# Patient Record
Sex: MALE | Race: WHITE
[De-identification: names, ages, dates, MRNs, and addresses within clinical notes are randomized per-mention and may not be internally consistent; named-entity substitution may affect disease eponyms.]

---

## 2017-03-24 ENCOUNTER — HOSPITAL ENCOUNTER (OUTPATIENT)
Dept: HOSPITAL 53 - M RAD | Age: 77
End: 2017-03-24
Attending: PSYCHIATRY & NEUROLOGY
Payer: MEDICARE

## 2017-03-24 DIAGNOSIS — M51.24: Primary | ICD-10-CM

## 2017-03-24 DIAGNOSIS — Z87.81: ICD-10-CM

## 2017-03-24 DIAGNOSIS — R20.2: ICD-10-CM

## 2017-03-24 DIAGNOSIS — G95.0: ICD-10-CM

## 2017-03-24 NOTE — REP
MRI CLEARANCE, FACIAL BONES, TWO VIEWS:

 

HISTORY: Foreign body.

 

There is no acute fracture, bone lesion or radiopaque foreign body. The sinuses

are clear.

 

IMPRESSION:

 

There is no radiopaque foreign body.

 

 

Signed by

Maury Fraire MD 03/24/2017 03:20 P

## 2017-03-24 NOTE — REP
MRI THORACIC SPINE WITHOUT CONTRAST:

 

HISTORY: Thoracic pain.

 

A disc bulge is present at the T4-5 level. There is minimal effacement of the

thecal sac without spinal cord compression. The T4 neural foramina are patent. A

disc bulge is present at the T5-6 level. There is minimal effacement of the

thecal sac without spinal cord compression. The T5 neural foramina are patent. A

disc bulge is present at the T9-10 level. There is minimal effacement of the

thecal sac without spinal cord compression. The T9 neural foramina are patent. A

disc bulge is present at the T10-11 level. There is minimal effacement of the

thecal sac without spinal cord compression. The T10 neural foramina are patent. A

disc bulge is present at the T11-12 level. There is minimal effacement of the

thecal sac without spinal cord compression. The T11 neural foramina are patent.

There is no other disc bulge or herniation. The remaining neural foramina are

patent. A small 1.3 mm syrinx is present in the spinal cord. This extends from

the T5-6 level inferior to the T7-8 level. The spinal cord is normal in size.

Increased signal intensity on T2-weighted images is present in the endplates of

several mid and lower thoracic vertebral bodies. There are old compression

fractures of the T7 through T11 vertebral bodies with minimal height loss .

 

IMPRESSION:

 

1. Disc bulges at the T4-5, T5-6 and T9-10 through T11-12 levels without spinal

cord compression.

 

2. Old compression fractures of the T7 through T11 vertebral bodies with minimal

height loss.

 

 

Signed by

Maury Fraire MD 03/24/2017 04:07 P

## 2018-07-20 ENCOUNTER — HOSPITAL ENCOUNTER (OUTPATIENT)
Dept: HOSPITAL 53 - M LABNEURO | Age: 78
End: 2018-07-20
Attending: PSYCHIATRY & NEUROLOGY
Payer: MEDICARE

## 2018-07-20 DIAGNOSIS — Z11.9: Primary | ICD-10-CM

## 2018-07-20 PROCEDURE — 36415 COLL VENOUS BLD VENIPUNCTURE: CPT

## 2018-07-22 LAB
B BURGDOR IGG+IGM SER-ACNC: <0.91 ISR (ref 0–0.9)
B BURGDOR IGM SER IA-ACNC: <0.8 INDEX (ref 0–0.79)

## 2019-07-11 ENCOUNTER — HOSPITAL ENCOUNTER (OUTPATIENT)
Dept: HOSPITAL 53 - M LABNEURO | Age: 79
End: 2019-07-11
Attending: PSYCHIATRY & NEUROLOGY
Payer: MEDICARE

## 2019-07-11 DIAGNOSIS — D51.9: Primary | ICD-10-CM

## 2019-07-11 LAB
FOLATE SERPL-MCNC: 9.6 NG/ML
VIT B12 SERPL-MCNC: 1373 PG/ML